# Patient Record
Sex: FEMALE | Race: WHITE | NOT HISPANIC OR LATINO | URBAN - METROPOLITAN AREA
[De-identification: names, ages, dates, MRNs, and addresses within clinical notes are randomized per-mention and may not be internally consistent; named-entity substitution may affect disease eponyms.]

---

## 2023-04-11 PROBLEM — T78.1XXA ALLERGIC REACTION TO FOOD: Status: ACTIVE | Noted: 2023-04-11

## 2023-04-11 PROBLEM — N95.1 MENOPAUSAL SYMPTOMS: Status: ACTIVE | Noted: 2023-04-11

## 2023-04-11 PROBLEM — Z88.9 DRUG ALLERGY: Status: ACTIVE | Noted: 2023-04-11

## 2023-04-11 PROBLEM — L73.9 FOLLICULITIS: Status: ACTIVE | Noted: 2023-04-11

## 2023-04-11 PROBLEM — E66.812 CLASS 2 OBESITY WITH BODY MASS INDEX (BMI) OF 39.0 TO 39.9 IN ADULT: Status: ACTIVE | Noted: 2023-04-11

## 2023-04-11 PROBLEM — K90.0 CELIAC DISEASE (HHS-HCC): Status: ACTIVE | Noted: 2023-04-11

## 2023-04-11 PROBLEM — Z91.013 SHELLFISH ALLERGY: Status: ACTIVE | Noted: 2023-04-11

## 2023-04-11 PROBLEM — E78.5 HYPERLIPIDEMIA: Status: ACTIVE | Noted: 2023-04-11

## 2023-04-11 PROBLEM — K29.70 GASTRITIS: Status: ACTIVE | Noted: 2023-04-11

## 2023-04-11 PROBLEM — J02.9 PHARYNGITIS: Status: ACTIVE | Noted: 2023-04-11

## 2023-04-11 PROBLEM — R35.0 INCREASED URINARY FREQUENCY: Status: ACTIVE | Noted: 2023-04-11

## 2023-04-11 PROBLEM — M25.571 RIGHT ANKLE PAIN: Status: ACTIVE | Noted: 2023-04-11

## 2023-04-11 PROBLEM — L30.9 DERMATITIS: Status: ACTIVE | Noted: 2023-04-11

## 2023-04-11 PROBLEM — M19.90 ARTHRITIS: Status: ACTIVE | Noted: 2023-04-11

## 2023-04-11 PROBLEM — I10 HTN (HYPERTENSION): Status: ACTIVE | Noted: 2023-04-11

## 2023-04-11 PROBLEM — E66.9 CLASS 2 OBESITY WITH BODY MASS INDEX (BMI) OF 39.0 TO 39.9 IN ADULT: Status: ACTIVE | Noted: 2023-04-11

## 2023-04-11 PROBLEM — M54.9 BACK PAIN: Status: ACTIVE | Noted: 2023-04-11

## 2023-04-11 RX ORDER — LOSARTAN POTASSIUM 100 MG/1
1 TABLET ORAL DAILY
COMMUNITY
Start: 2018-05-18 | End: 2024-01-26 | Stop reason: SDUPTHER

## 2023-04-12 ENCOUNTER — OFFICE VISIT (OUTPATIENT)
Dept: PRIMARY CARE | Facility: CLINIC | Age: 58
End: 2023-04-12
Payer: COMMERCIAL

## 2023-04-12 VITALS
WEIGHT: 242 LBS | BODY MASS INDEX: 38.89 KG/M2 | SYSTOLIC BLOOD PRESSURE: 138 MMHG | HEIGHT: 66 IN | HEART RATE: 80 BPM | DIASTOLIC BLOOD PRESSURE: 84 MMHG

## 2023-04-12 DIAGNOSIS — L72.3 INFLAMED SEBACEOUS CYST: Primary | ICD-10-CM

## 2023-04-12 PROCEDURE — 3075F SYST BP GE 130 - 139MM HG: CPT | Performed by: NURSE PRACTITIONER

## 2023-04-12 PROCEDURE — 99213 OFFICE O/P EST LOW 20 MIN: CPT | Performed by: NURSE PRACTITIONER

## 2023-04-12 PROCEDURE — 3079F DIAST BP 80-89 MM HG: CPT | Performed by: NURSE PRACTITIONER

## 2023-04-12 PROCEDURE — 1036F TOBACCO NON-USER: CPT | Performed by: NURSE PRACTITIONER

## 2023-04-12 ASSESSMENT — ENCOUNTER SYMPTOMS
SORE THROAT: 0
HEADACHES: 0
NAUSEA: 0
PSYCHIATRIC NEGATIVE: 1
CONSTIPATION: 0
VOMITING: 0
DIARRHEA: 0
ABDOMINAL PAIN: 0
FEVER: 0
FATIGUE: 0
MUSCULOSKELETAL NEGATIVE: 1
DIZZINESS: 0
PALPITATIONS: 0
CHILLS: 0
COUGH: 0
NUMBNESS: 0
WEAKNESS: 0
SHORTNESS OF BREATH: 0

## 2023-04-12 ASSESSMENT — PATIENT HEALTH QUESTIONNAIRE - PHQ9
1. LITTLE INTEREST OR PLEASURE IN DOING THINGS: NOT AT ALL
SUM OF ALL RESPONSES TO PHQ9 QUESTIONS 1 AND 2: 0
2. FEELING DOWN, DEPRESSED OR HOPELESS: NOT AT ALL

## 2023-04-12 NOTE — PROGRESS NOTES
"Subjective   Patient ID: Eliz Trevino is a 58 y.o. female who presents for lump on her neck.    HPI  Patient is has a small lump on the front of her neck over her trachea.  Started out small but over the last couple weeks has gotten larger and tender.  She also notices a another small 1 starting to the side of the original lump.  Denies chest pain, SOB, palpitations, dizziness,  or GI issues.      Review of Systems   Constitutional:  Negative for chills, fatigue and fever.   HENT:  Negative for congestion, ear pain and sore throat.    Eyes:  Negative for visual disturbance.   Respiratory:  Negative for cough and shortness of breath.    Cardiovascular:  Negative for chest pain, palpitations and leg swelling.   Gastrointestinal:  Negative for abdominal pain, constipation, diarrhea, nausea and vomiting.   Genitourinary: Negative.    Musculoskeletal: Negative.    Skin:  Negative for rash.   Neurological:  Negative for dizziness, weakness, numbness and headaches.   Psychiatric/Behavioral: Negative.         Objective   /84   Pulse 80   Ht 1.676 m (5' 6\")   Wt 110 kg (242 lb)   BMI 39.06 kg/m²     Physical Exam  Constitutional:       General: She is not in acute distress.     Appearance: Normal appearance.   HENT:      Head: Normocephalic and atraumatic.      Right Ear: Tympanic membrane, ear canal and external ear normal.      Left Ear: Tympanic membrane, ear canal and external ear normal.      Nose: Nose normal.      Mouth/Throat:      Mouth: Mucous membranes are moist.      Pharynx: Oropharynx is clear.   Eyes:      Extraocular Movements: Extraocular movements intact.      Conjunctiva/sclera: Conjunctivae normal.      Pupils: Pupils are equal, round, and reactive to light.   Cardiovascular:      Rate and Rhythm: Normal rate and regular rhythm.      Pulses: Normal pulses.      Heart sounds: Normal heart sounds. No murmur heard.  Pulmonary:      Effort: Pulmonary effort is normal.      Breath sounds: " Normal breath sounds. No wheezing, rhonchi or rales.   Abdominal:      General: Bowel sounds are normal.      Palpations: Abdomen is soft.      Tenderness: There is no abdominal tenderness.   Musculoskeletal:         General: Normal range of motion.      Cervical back: Normal range of motion and neck supple.   Lymphadenopathy:      Comments: No lymphadenopathy noted   Skin:     General: Skin is warm and dry.      Findings: No rash.   Neurological:      General: No focal deficit present.      Mental Status: She is alert and oriented to person, place, and time.      Cranial Nerves: No cranial nerve deficit.      Coordination: Coordination normal.      Gait: Gait normal.   Psychiatric:         Mood and Affect: Mood normal.         Behavior: Behavior normal.         Assessment/Plan

## 2024-01-26 DIAGNOSIS — I10 HYPERTENSION, UNSPECIFIED TYPE: Primary | ICD-10-CM

## 2024-01-26 NOTE — TELEPHONE ENCOUNTER
Requested Prescriptions     Pending Prescriptions Disp Refills    losartan (Cozaar) 100 mg tablet 30 tablet 0     Sig: Take 1 tablet (100 mg) by mouth once daily. Must be seen for further refills

## 2024-01-29 RX ORDER — LOSARTAN POTASSIUM 100 MG/1
100 TABLET ORAL DAILY
Qty: 30 TABLET | Refills: 0 | Status: SHIPPED | OUTPATIENT
Start: 2024-01-29 | End: 2024-02-21 | Stop reason: SDUPTHER

## 2024-02-21 ENCOUNTER — OFFICE VISIT (OUTPATIENT)
Dept: PRIMARY CARE | Facility: CLINIC | Age: 59
End: 2024-02-21
Payer: COMMERCIAL

## 2024-02-21 VITALS
BODY MASS INDEX: 40.18 KG/M2 | HEART RATE: 85 BPM | SYSTOLIC BLOOD PRESSURE: 124 MMHG | OXYGEN SATURATION: 97 % | DIASTOLIC BLOOD PRESSURE: 79 MMHG | WEIGHT: 250 LBS | HEIGHT: 66 IN

## 2024-02-21 DIAGNOSIS — E55.9 VITAMIN D DEFICIENCY: ICD-10-CM

## 2024-02-21 DIAGNOSIS — Z12.31 ENCOUNTER FOR SCREENING MAMMOGRAM FOR MALIGNANT NEOPLASM OF BREAST: ICD-10-CM

## 2024-02-21 DIAGNOSIS — Z12.11 ENCOUNTER FOR SCREENING FOR MALIGNANT NEOPLASM OF COLON: ICD-10-CM

## 2024-02-21 DIAGNOSIS — I10 HYPERTENSION, UNSPECIFIED TYPE: Primary | ICD-10-CM

## 2024-02-21 DIAGNOSIS — E78.5 HYPERLIPIDEMIA, UNSPECIFIED HYPERLIPIDEMIA TYPE: ICD-10-CM

## 2024-02-21 PROCEDURE — 99214 OFFICE O/P EST MOD 30 MIN: CPT | Performed by: NURSE PRACTITIONER

## 2024-02-21 PROCEDURE — 3074F SYST BP LT 130 MM HG: CPT | Performed by: NURSE PRACTITIONER

## 2024-02-21 PROCEDURE — 1036F TOBACCO NON-USER: CPT | Performed by: NURSE PRACTITIONER

## 2024-02-21 PROCEDURE — 3078F DIAST BP <80 MM HG: CPT | Performed by: NURSE PRACTITIONER

## 2024-02-21 RX ORDER — LOSARTAN POTASSIUM 100 MG/1
100 TABLET ORAL DAILY
Qty: 90 TABLET | Refills: 3 | Status: SHIPPED | OUTPATIENT
Start: 2024-02-21

## 2024-02-21 ASSESSMENT — ENCOUNTER SYMPTOMS
ABDOMINAL PAIN: 0
MUSCULOSKELETAL NEGATIVE: 1
CHILLS: 0
NAUSEA: 0
WEAKNESS: 0
FEVER: 0
NUMBNESS: 0
PSYCHIATRIC NEGATIVE: 1
VOMITING: 0
COUGH: 0
DIZZINESS: 0
CONSTIPATION: 0
DIARRHEA: 0
HEADACHES: 0
PALPITATIONS: 0
SORE THROAT: 0
SHORTNESS OF BREATH: 0
FATIGUE: 0

## 2024-02-21 NOTE — PROGRESS NOTES
"Subjective   Patient ID: Eliz Trevino is a 58 y.o. female who presents for Med Refill.    Med Refill  Pertinent negatives include no abdominal pain, chest pain, chills, congestion, coughing, fatigue, fever, headaches, nausea, numbness, rash, sore throat, vomiting or weakness.      HPI:  Overall doing well.  Denies chest pain, SOB, palpitations, dizziness,  or GI issues.  Taking medications as prescribed   BP well controlled.      Review of Systems   Constitutional:  Negative for chills, fatigue and fever.   HENT:  Negative for congestion, ear pain and sore throat.    Eyes:  Negative for visual disturbance.   Respiratory:  Negative for cough and shortness of breath.    Cardiovascular:  Negative for chest pain, palpitations and leg swelling.   Gastrointestinal:  Negative for abdominal pain, constipation, diarrhea, nausea and vomiting.   Genitourinary: Negative.    Musculoskeletal: Negative.    Skin:  Negative for rash.   Neurological:  Negative for dizziness, weakness, numbness and headaches.   Psychiatric/Behavioral: Negative.         Objective   /79   Pulse 85   Ht 1.676 m (5' 6\")   Wt 113 kg (250 lb)   SpO2 97%   BMI 40.35 kg/m²     Physical Exam  Constitutional:       General: She is not in acute distress.     Appearance: Normal appearance.   HENT:      Head: Normocephalic and atraumatic.      Right Ear: Tympanic membrane, ear canal and external ear normal.      Left Ear: Tympanic membrane, ear canal and external ear normal.      Nose: Nose normal.      Mouth/Throat:      Mouth: Mucous membranes are moist.      Pharynx: Oropharynx is clear.   Eyes:      Extraocular Movements: Extraocular movements intact.      Conjunctiva/sclera: Conjunctivae normal.      Pupils: Pupils are equal, round, and reactive to light.   Cardiovascular:      Rate and Rhythm: Normal rate and regular rhythm.      Pulses: Normal pulses.      Heart sounds: Normal heart sounds. No murmur heard.  Pulmonary:      Effort: " Pulmonary effort is normal.      Breath sounds: Normal breath sounds. No wheezing, rhonchi or rales.   Abdominal:      General: Bowel sounds are normal.      Palpations: Abdomen is soft.      Tenderness: There is no abdominal tenderness.   Musculoskeletal:         General: Normal range of motion.      Cervical back: Normal range of motion and neck supple.   Lymphadenopathy:      Comments: No lymphadenopathy noted   Skin:     General: Skin is warm and dry.      Findings: No rash.   Neurological:      General: No focal deficit present.      Mental Status: She is alert and oriented to person, place, and time.      Cranial Nerves: No cranial nerve deficit.      Coordination: Coordination normal.      Gait: Gait normal.   Psychiatric:         Mood and Affect: Mood normal.         Behavior: Behavior normal.         Assessment/Plan   Problem List Items Addressed This Visit             ICD-10-CM    HTN (hypertension) - Primary I10    Relevant Medications    losartan (Cozaar) 100 mg tablet    Other Relevant Orders    Comprehensive Metabolic Panel    TSH with reflex to Free T4 if abnormal    Lipid Panel    Hyperlipidemia E78.5    Relevant Orders    Comprehensive Metabolic Panel    TSH with reflex to Free T4 if abnormal    Lipid Panel    CBC and Auto Differential    Vitamin D deficiency E55.9    Relevant Orders    Vitamin D 25-Hydroxy,Total (for eval of Vitamin D levels)    Encounter for screening mammogram for malignant neoplasm of breast Z12.31    Relevant Orders    BI mammo bilateral screening tomosynthesis   Follow up in 1 year or sooner if needed

## 2024-03-13 ENCOUNTER — APPOINTMENT (OUTPATIENT)
Dept: OBSTETRICS AND GYNECOLOGY | Facility: CLINIC | Age: 59
End: 2024-03-13
Payer: COMMERCIAL

## 2024-07-17 ENCOUNTER — OFFICE VISIT (OUTPATIENT)
Dept: PRIMARY CARE | Facility: CLINIC | Age: 59
End: 2024-07-17
Payer: COMMERCIAL

## 2024-07-17 ENCOUNTER — HOSPITAL ENCOUNTER (OUTPATIENT)
Dept: RADIOLOGY | Facility: HOSPITAL | Age: 59
Discharge: HOME | End: 2024-07-17
Payer: COMMERCIAL

## 2024-07-17 VITALS
SYSTOLIC BLOOD PRESSURE: 134 MMHG | DIASTOLIC BLOOD PRESSURE: 91 MMHG | OXYGEN SATURATION: 99 % | HEART RATE: 82 BPM | WEIGHT: 263 LBS | BODY MASS INDEX: 42.45 KG/M2

## 2024-07-17 DIAGNOSIS — M79.89 LEFT LEG SWELLING: ICD-10-CM

## 2024-07-17 DIAGNOSIS — M79.605 LEFT LEG PAIN: ICD-10-CM

## 2024-07-17 DIAGNOSIS — Z12.31 VISIT FOR SCREENING MAMMOGRAM: ICD-10-CM

## 2024-07-17 DIAGNOSIS — M79.89 LEFT LEG SWELLING: Primary | ICD-10-CM

## 2024-07-17 DIAGNOSIS — S89.92XA INJURY OF LEFT LOWER EXTREMITY, INITIAL ENCOUNTER: ICD-10-CM

## 2024-07-17 DIAGNOSIS — Z12.11 ENCOUNTER FOR SCREENING FOR MALIGNANT NEOPLASM OF COLON: ICD-10-CM

## 2024-07-17 PROCEDURE — 99214 OFFICE O/P EST MOD 30 MIN: CPT | Performed by: INTERNAL MEDICINE

## 2024-07-17 PROCEDURE — 93971 EXTREMITY STUDY: CPT

## 2024-07-17 PROCEDURE — 3080F DIAST BP >= 90 MM HG: CPT | Performed by: INTERNAL MEDICINE

## 2024-07-17 PROCEDURE — 3075F SYST BP GE 130 - 139MM HG: CPT | Performed by: INTERNAL MEDICINE

## 2024-07-17 PROCEDURE — 73590 X-RAY EXAM OF LOWER LEG: CPT | Mod: LT

## 2024-07-17 NOTE — PATIENT INSTRUCTIONS
Left leg with increase pain and swelling weeks after injury  Get xray of tibia  Get ultrasound of the leg to check for blood clot    Mammogram due, call [phone] to schedule mammogram  Bone density at age 62  Colon screening, will do annual stool test, ordered    Get Tdap, if not tetanus in the last 10 years, check with [facility] records to see if you had one there and when    Bp is high, , check bp at home 2-3 times a week and send in readings.   If high at home, will add second medication

## 2024-07-17 NOTE — PROGRESS NOTES
Subjective   Patient ID: Eliz Trevino is a 59 y.o. female who presents for Leg Injury (Fell 3-4 weeks ago / swelling left leg).    HPI     Review of Systems    Objective   Wt 119 kg (263 lb)   BMI 42.45 kg/m²     Physical Exam    Assessment/Plan

## 2024-07-17 NOTE — PROGRESS NOTES
Subjective   Patient ID: Eliz Trevino is a 59 y.o. female who presents for Leg Injury (Fell 3-4 weeks ago / swelling left leg).    1 month ago, she tripped up the steps into the house and the metal part of the step.   Was left shin and not bad initially but now swelling and a bit more tender. Doesn't hurt to walk , hurts to brush up against it. No hx of blood clots.   Started to swell, weeks after the injury.     No cp or pressure  She was lightheaded when she woke up during the day, has been more dizzy than faint. Not when rolling over in bed. Was bad on Sunday, she drank more water, but notes intermittently, notes when she goes sitting to standing.     Bowels are regular  No urinary issues, slight leakage, urge and stress but minor.   Mood is good.   Sleeps okay.          Review of Systems    Objective   BP (!) 134/91   Pulse 82   Wt 119 kg (263 lb)   SpO2 99%   BMI 42.45 kg/m²     Physical Exam  Constitutional:       Appearance: Normal appearance.   Cardiovascular:      Rate and Rhythm: Normal rate and regular rhythm.      Heart sounds: No murmur heard.  Pulmonary:      Breath sounds: Normal breath sounds.   Musculoskeletal:      Comments: Left leg with slight swelling/fullness just inferior and lateral to the patella  No calf pain to pressure  1+ edema on the left  Negative Amilcar's    Neurological:      Mental Status: She is alert.         Assessment/Plan          Patient Instructions   Left leg with increase pain and swelling weeks after injury  Get xray of tibia  Get ultrasound of the leg to check for blood clot    Mammogram due, call [phone] to schedule mammogram  Bone density at age 62  Colon screening, will do annual stool test, ordered    Get Tdap, if not tetanus in the last 10 years, check with [facility] clinic records to see if you had one there and when    Bp is high, , check bp at home 2-3 times a week and send in readings.   If high at home, will add second medication

## 2024-07-18 ENCOUNTER — LAB (OUTPATIENT)
Dept: LAB | Facility: LAB | Age: 59
End: 2024-07-18
Payer: COMMERCIAL

## 2024-07-18 DIAGNOSIS — E55.9 VITAMIN D DEFICIENCY: ICD-10-CM

## 2024-07-18 DIAGNOSIS — E78.5 HYPERLIPIDEMIA, UNSPECIFIED HYPERLIPIDEMIA TYPE: ICD-10-CM

## 2024-07-18 DIAGNOSIS — I10 HYPERTENSION, UNSPECIFIED TYPE: ICD-10-CM

## 2024-07-18 LAB
25(OH)D3 SERPL-MCNC: 14 NG/ML (ref 30–100)
ALBUMIN SERPL BCP-MCNC: 4.3 G/DL (ref 3.4–5)
ALP SERPL-CCNC: 103 U/L (ref 33–110)
ALT SERPL W P-5'-P-CCNC: 22 U/L (ref 7–45)
ANION GAP SERPL CALC-SCNC: 14 MMOL/L (ref 10–20)
AST SERPL W P-5'-P-CCNC: 23 U/L (ref 9–39)
BASOPHILS # BLD AUTO: 0.05 X10*3/UL (ref 0–0.1)
BASOPHILS NFR BLD AUTO: 1 %
BILIRUB SERPL-MCNC: 0.5 MG/DL (ref 0–1.2)
BUN SERPL-MCNC: 17 MG/DL (ref 6–23)
CALCIUM SERPL-MCNC: 9.3 MG/DL (ref 8.6–10.6)
CHLORIDE SERPL-SCNC: 106 MMOL/L (ref 98–107)
CHOLEST SERPL-MCNC: 220 MG/DL (ref 0–199)
CHOLESTEROL/HDL RATIO: 3
CO2 SERPL-SCNC: 25 MMOL/L (ref 21–32)
CREAT SERPL-MCNC: 0.73 MG/DL (ref 0.5–1.05)
EGFRCR SERPLBLD CKD-EPI 2021: >90 ML/MIN/1.73M*2
EOSINOPHIL # BLD AUTO: 0.16 X10*3/UL (ref 0–0.7)
EOSINOPHIL NFR BLD AUTO: 3.1 %
ERYTHROCYTE [DISTWIDTH] IN BLOOD BY AUTOMATED COUNT: 13 % (ref 11.5–14.5)
GLUCOSE SERPL-MCNC: 94 MG/DL (ref 74–99)
HCT VFR BLD AUTO: 41.1 % (ref 36–46)
HDLC SERPL-MCNC: 73.1 MG/DL
HGB BLD-MCNC: 13 G/DL (ref 12–16)
IMM GRANULOCYTES # BLD AUTO: 0.02 X10*3/UL (ref 0–0.7)
IMM GRANULOCYTES NFR BLD AUTO: 0.4 % (ref 0–0.9)
LDLC SERPL CALC-MCNC: 133 MG/DL
LYMPHOCYTES # BLD AUTO: 2.11 X10*3/UL (ref 1.2–4.8)
LYMPHOCYTES NFR BLD AUTO: 40.4 %
MCH RBC QN AUTO: 29.4 PG (ref 26–34)
MCHC RBC AUTO-ENTMCNC: 31.6 G/DL (ref 32–36)
MCV RBC AUTO: 93 FL (ref 80–100)
MONOCYTES # BLD AUTO: 0.49 X10*3/UL (ref 0.1–1)
MONOCYTES NFR BLD AUTO: 9.4 %
NEUTROPHILS # BLD AUTO: 2.39 X10*3/UL (ref 1.2–7.7)
NEUTROPHILS NFR BLD AUTO: 45.7 %
NON HDL CHOLESTEROL: 147 MG/DL (ref 0–149)
NRBC BLD-RTO: 0 /100 WBCS (ref 0–0)
PLATELET # BLD AUTO: 180 X10*3/UL (ref 150–450)
POTASSIUM SERPL-SCNC: 4 MMOL/L (ref 3.5–5.3)
PROT SERPL-MCNC: 6.7 G/DL (ref 6.4–8.2)
RBC # BLD AUTO: 4.42 X10*6/UL (ref 4–5.2)
SODIUM SERPL-SCNC: 141 MMOL/L (ref 136–145)
TRIGL SERPL-MCNC: 70 MG/DL (ref 0–149)
TSH SERPL-ACNC: 2.91 MIU/L (ref 0.44–3.98)
VLDL: 14 MG/DL (ref 0–40)
WBC # BLD AUTO: 5.2 X10*3/UL (ref 4.4–11.3)

## 2024-07-18 PROCEDURE — 85025 COMPLETE CBC W/AUTO DIFF WBC: CPT

## 2024-07-18 PROCEDURE — 84443 ASSAY THYROID STIM HORMONE: CPT

## 2024-07-18 PROCEDURE — 82306 VITAMIN D 25 HYDROXY: CPT

## 2024-07-18 PROCEDURE — 36415 COLL VENOUS BLD VENIPUNCTURE: CPT

## 2024-07-18 PROCEDURE — 80061 LIPID PANEL: CPT

## 2024-07-18 PROCEDURE — 80053 COMPREHEN METABOLIC PANEL: CPT

## 2024-07-19 ENCOUNTER — APPOINTMENT (OUTPATIENT)
Dept: PRIMARY CARE | Facility: CLINIC | Age: 59
End: 2024-07-19
Payer: COMMERCIAL

## 2024-07-23 ENCOUNTER — HOSPITAL ENCOUNTER (OUTPATIENT)
Dept: RADIOLOGY | Facility: HOSPITAL | Age: 59
Discharge: HOME | End: 2024-07-23
Payer: COMMERCIAL

## 2024-07-23 VITALS — HEIGHT: 66 IN | BODY MASS INDEX: 42.27 KG/M2 | WEIGHT: 263 LBS

## 2024-07-23 DIAGNOSIS — Z12.31 VISIT FOR SCREENING MAMMOGRAM: ICD-10-CM

## 2024-07-23 PROCEDURE — 77063 BREAST TOMOSYNTHESIS BI: CPT | Performed by: RADIOLOGY

## 2024-07-23 PROCEDURE — 77067 SCR MAMMO BI INCL CAD: CPT | Performed by: RADIOLOGY

## 2024-07-23 PROCEDURE — 77067 SCR MAMMO BI INCL CAD: CPT

## 2024-09-11 NOTE — PROGRESS NOTES
ASSESSMENT/PLAN  Encounter for well woman exam with routine gynecological exam  Routine well woman visit.   Your exam was normal today.   A pap and HPV test was done. If you are connected with the  on line system, you will be notified about your pap results through the patient portal in 2-3 weeks.      Encounter for screening mammogram for breast cancer  A screening mammogram requisition has been placed for 7/2024.    Extremely dense tissue of both breasts on mammography  Family history of breast cancer  - Referral to High Risk Breast Cancer; Future    Vulvar pruritus.   Reviewed vulvar care handout and gave to pt.  Okay to use over the counter hydrocortisone cream on focal area of labia. If persists, RTO.          SUBJECTIVE    PAP 2015 ASCUS HPV NEG  MAMMO 7-23-24   DEXA NEVER   COLON NEVER     HPI    58 yo presents for routine well woman visit.   Last visit over 5 years ago.  Menopausal since age 52.   C/o itchiness of bilateral labia. Also has left labial lump. Was using pantiliners but stopped. Does not use wipes.  7/2024 mammogram extremely dense breasts on imaging. Reviewed findings and significance.  Pts family history significant for mom and maternal GM h/o breast CA. No genetic testing for pt and family.  We talked about options, pt accepts referral to high risk breast center. Also discussed fast breast MRI.     REVIEW OF SYSTEMS    Constitutional: no fever, no chills, no recent weight gain, no recent weight loss, no fatigue.   Eyes: no eye pain, no vision problems, + dryness of the eyes.   ENT: no hearing loss, no nosebleeds, no sinus congestion.   Cardiovascular: no chest pain, no palpitations.  Respiratory: no shortness of breath, no cough, no wheezing.   Gastrointestinal: no abdominal pain, no constipation, no nausea, no diarrhea, no vomiting.   Genitourinary: no pelvic pain, no dysuria, no urinary incontinence, no change in urinary frequency, no vaginal dryness, + vaginal itching,  no vaginal  "discharge, no unexplained vaginal bleeding, no lesion/sore.   Musculoskeletal: no back pain, + joint swelling and no leg edema.   Integumentary: no rashes, no skin lesions, no breast pain, no nipple discharge, no breast lump.   Neurological: no headache, no numbness, no dizziness.   Psychiatric: no sleep disturbances, no anxiety, no depression.   Endocrine: no hot flashes, no loss of hair, no hirsutism.   Hematologic/Lymphatic: no swollen glands, no tendency for easy bleeding, no tendency for easy bruising.      OBJECTIVE    /80   Ht 1.664 m (5' 5.5\")   Wt 116 kg (255 lb)   BMI 41.79 kg/m²       Physical Exam     Constitutional: Alert and in no acute distress. Well developed, well nourished   Head and Face: Head and face: normal   Eyes: Normal external exam - nonicteric sclera, extraocular movements intact (EOMI) and no ptosis.   Ears, Nose, Mouth, and Throat: External inspection of ears and nose: normal   Neck: no neck asymmetry. Supple and thyroid not enlarged and there were no palpable thyroid nodules   Cardiovascular: Heart rate and rhythm were normal, normal S1 and S2, no gallops, and no murmurs   Pulmonary: No respiratory distress and clear bilateral breath sounds   Chest: Breasts: normal appearance, no nipple discharge and no skin changes and palpation of breasts and axillae: no palpable mass and no axillary lymphadenopathy   Abdomen: soft nontender; no abdominal mass palpated, no organomegaly and no hernias   Genitourinary: external genitalia: Left upper labia is a 1 x .5cm scaly area, mild erythema, otherwise no other lesions, no erythema, inguinal lymphadenopathy, Bartholin's urethral and [employer]'s glands: normal, urethra: normal, bladder: normal on palpation and perianal area: normal   Vagina: normal.   Cervix: Normal appearing without lesions.  Uterus: Normal, mobile, nontender.  Right Adnexa/parametria: Normal.   Left Adnexa/parametria: Normal.   Skin: normal skin color and pigmentation, normal " skin turgor, and no rash.   Psychiatric: alert and oriented x 3., affect normal to patient baseline and mood: appropriate        Katia Lewis MD

## 2024-09-12 ENCOUNTER — APPOINTMENT (OUTPATIENT)
Dept: OBSTETRICS AND GYNECOLOGY | Facility: CLINIC | Age: 59
End: 2024-09-12
Payer: COMMERCIAL

## 2024-09-12 VITALS
BODY MASS INDEX: 40.98 KG/M2 | HEIGHT: 66 IN | WEIGHT: 255 LBS | SYSTOLIC BLOOD PRESSURE: 126 MMHG | DIASTOLIC BLOOD PRESSURE: 80 MMHG

## 2024-09-12 DIAGNOSIS — Z12.31 ENCOUNTER FOR SCREENING MAMMOGRAM FOR BREAST CANCER: ICD-10-CM

## 2024-09-12 DIAGNOSIS — R92.343 EXTREMELY DENSE TISSUE OF BOTH BREASTS ON MAMMOGRAPHY: ICD-10-CM

## 2024-09-12 DIAGNOSIS — Z80.3 FAMILY HISTORY OF BREAST CANCER: ICD-10-CM

## 2024-09-12 DIAGNOSIS — Z12.4 CERVICAL CANCER SCREENING: ICD-10-CM

## 2024-09-12 DIAGNOSIS — Z01.419 ENCOUNTER FOR WELL WOMAN EXAM WITH ROUTINE GYNECOLOGICAL EXAM: Primary | ICD-10-CM

## 2024-09-12 PROCEDURE — 99386 PREV VISIT NEW AGE 40-64: CPT | Performed by: OBSTETRICS & GYNECOLOGY

## 2024-09-12 PROCEDURE — 3074F SYST BP LT 130 MM HG: CPT | Performed by: OBSTETRICS & GYNECOLOGY

## 2024-09-12 PROCEDURE — 3079F DIAST BP 80-89 MM HG: CPT | Performed by: OBSTETRICS & GYNECOLOGY

## 2024-09-12 PROCEDURE — 88175 CYTOPATH C/V AUTO FLUID REDO: CPT

## 2024-09-12 PROCEDURE — 87624 HPV HI-RISK TYP POOLED RSLT: CPT

## 2024-09-12 PROCEDURE — 1036F TOBACCO NON-USER: CPT | Performed by: OBSTETRICS & GYNECOLOGY

## 2024-09-12 PROCEDURE — 3008F BODY MASS INDEX DOCD: CPT | Performed by: OBSTETRICS & GYNECOLOGY

## 2025-01-15 ENCOUNTER — APPOINTMENT (OUTPATIENT)
Dept: SURGICAL ONCOLOGY | Facility: CLINIC | Age: 60
End: 2025-01-15
Payer: COMMERCIAL

## 2025-01-15 ENCOUNTER — OFFICE VISIT (OUTPATIENT)
Dept: URGENT CARE | Age: 60
End: 2025-01-15
Payer: COMMERCIAL

## 2025-01-15 VITALS
TEMPERATURE: 97.8 F | OXYGEN SATURATION: 98 % | HEART RATE: 94 BPM | SYSTOLIC BLOOD PRESSURE: 137 MMHG | DIASTOLIC BLOOD PRESSURE: 84 MMHG

## 2025-01-15 DIAGNOSIS — Z01.30 BLOOD PRESSURE CHECK: Primary | ICD-10-CM

## 2025-01-15 PROCEDURE — 3075F SYST BP GE 130 - 139MM HG: CPT

## 2025-01-15 PROCEDURE — 3079F DIAST BP 80-89 MM HG: CPT

## 2025-01-15 PROCEDURE — 99203 OFFICE O/P NEW LOW 30 MIN: CPT

## 2025-01-15 NOTE — PATIENT INSTRUCTIONS
You were seen at Urgent Care today for blood pressure check.  Please treat as discussed. Monitor for red flags which we spoke about, If your symptoms change, worsen or become concerning in any way, please go to the emergency room immediately, otherwise you can followup with your PCP in 2-3 days as needed.  A referral has been placed on your behalf.

## 2025-01-15 NOTE — PROGRESS NOTES
Subjective   Patient ID: Eliz Trevino is a 59 y.o. female. They present today with a chief complaint of Blood Pressure Check (Patient believes her blood pressure is right. ).    History of Present Illness  Patient is a very pleasant 59-year-old female with history of hypertension who presents to urgent care today with concern for elevated blood pressure.  She states she is currently taking blood pressure medication but checked her pressure at home this morning and it was unusually elevated.  She is not currently have a PCP as her doctor retired and she is trying to find a new one.  She denies any headaches, dizziness, numbness, tingling, weakness, chest pain or shortness of breath.  No other complaints or concerns mention at this time.      History provided by:  Patient      Past Medical History  Allergies as of 01/15/2025 - Reviewed 01/15/2025   Allergen Reaction Noted    Penicillins Itching 04/11/2023    Shellfish containing products Itching 04/11/2023       (Not in a hospital admission)         Past Medical History:   Diagnosis Date    Body mass index (BMI)40.0-44.9, adult 09/10/2021    BMI 40.0-44.9, adult    Elevated blood-pressure reading, without diagnosis of hypertension 01/24/2018    Elevated blood-pressure reading without diagnosis of hypertension    Personal history of other diseases of the female genital tract     Vaginal delivery    Personal history of other diseases of the female genital tract 01/24/2018    History of endometriosis    Personal history of other diseases of the nervous system and sense organs 01/24/2018    History of carpal tunnel syndrome    Plantar fascial fibromatosis 01/24/2018    Plantar fasciitis       Past Surgical History:   Procedure Laterality Date    LAPAROSCOPY DIAGNOSTIC / BIOPSY / ASPIRATION / LYSIS  06/20/2014    Exploratory Laparoscopy        reports that she has never smoked. She has never used smokeless tobacco. She reports current alcohol use. She reports that she  does not use drugs.    Review of Systems  Review of Systems   All other systems reviewed and are negative.                                 Objective    Vitals:    01/15/25 0935   BP: 137/84   BP Location: Left arm   Patient Position: Sitting   BP Cuff Size: Large adult   Pulse: 94   Temp: 36.6 °C (97.8 °F)   TempSrc: Oral   SpO2: 98%     No LMP recorded. Patient is postmenopausal.    Physical Exam  Vitals and nursing note reviewed.   Constitutional:       General: She is not in acute distress.     Appearance: Normal appearance. She is not ill-appearing, toxic-appearing or diaphoretic.   HENT:      Head: Normocephalic and atraumatic.      Mouth/Throat:      Mouth: Mucous membranes are moist.   Eyes:      Extraocular Movements: Extraocular movements intact.      Conjunctiva/sclera: Conjunctivae normal.      Pupils: Pupils are equal, round, and reactive to light.   Cardiovascular:      Rate and Rhythm: Normal rate and regular rhythm.      Pulses: Normal pulses.      Heart sounds: Normal heart sounds.   Pulmonary:      Effort: Pulmonary effort is normal. No respiratory distress.      Breath sounds: Normal breath sounds. No stridor. No wheezing, rhonchi or rales.   Chest:      Chest wall: No tenderness.   Musculoskeletal:         General: Normal range of motion.      Cervical back: Normal range of motion and neck supple.   Skin:     General: Skin is warm and dry.      Capillary Refill: Capillary refill takes less than 2 seconds.   Neurological:      General: No focal deficit present.      Mental Status: She is alert and oriented to person, place, and time.   Psychiatric:         Mood and Affect: Mood normal.         Behavior: Behavior normal.         Procedures      Assessment/Plan   Allergies, medications, history, and pertinent labs/EKGs/Imaging reviewed by ZOË Armstrong.     Medical Decision Making    Patient is well appearing, afebrile, non toxic, not hypoxic, and appropriate for outpatient treatment and  "management at time of evaluation. Patient presents with concern for elevated blood pressure.     Differential includes but not limited to: Hypertension, anxiety, TIA, other    On exam, patient is alert and oriented.  She is no acute distress.  She is answering questions appropriately.  She is afebrile appears well-hydrated.  Blood pressure is 137/84.  Vitals are within normal limits.  She currently has no physical complaints but states every now and then she feels \"like my eyes are moving\" especially after looking at something for a long time.    While her blood pressure is slightly elevated, I do not feel it warrants emergency room evaluation at this time.  I recommended she recheck her pressure multiple times throughout the day after resting 10 to 15 minutes prior to check.  Also recommended she ensure cuff size is appropriate.  Additionally, recommended she visit her eye doctor for checkup.  A referral to PCP was placed on her behalf.  Red flags and ER precautions discussed.  She voices understanding and is agreeable to this plan.  She was discharged in stable condition.  All questions and concerns addressed.           Orders and Diagnoses  Diagnoses and all orders for this visit:  Blood pressure check  -     Referral to Primary Care; Future      Medical Admin Record      Follow Up Instructions  No follow-ups on file.    Patient disposition: Home    Electronically signed by ZOË Armstrong  10:05 AM  "

## 2025-03-11 ENCOUNTER — OFFICE VISIT (OUTPATIENT)
Dept: URGENT CARE | Age: 60
End: 2025-03-11
Payer: COMMERCIAL

## 2025-03-11 VITALS
TEMPERATURE: 97.9 F | HEART RATE: 80 BPM | DIASTOLIC BLOOD PRESSURE: 84 MMHG | RESPIRATION RATE: 16 BRPM | SYSTOLIC BLOOD PRESSURE: 140 MMHG | OXYGEN SATURATION: 99 %

## 2025-03-11 DIAGNOSIS — Z76.0 ENCOUNTER FOR MEDICATION REFILL: ICD-10-CM

## 2025-03-11 DIAGNOSIS — I10 ESSENTIAL HYPERTENSION, BENIGN: Primary | ICD-10-CM

## 2025-03-11 PROCEDURE — 3077F SYST BP >= 140 MM HG: CPT | Performed by: SPECIALIST

## 2025-03-11 PROCEDURE — 99213 OFFICE O/P EST LOW 20 MIN: CPT | Performed by: SPECIALIST

## 2025-03-11 PROCEDURE — 3079F DIAST BP 80-89 MM HG: CPT | Performed by: SPECIALIST

## 2025-03-11 RX ORDER — LOSARTAN POTASSIUM 100 MG/1
100 TABLET ORAL DAILY
Qty: 90 TABLET | Refills: 1 | Status: SHIPPED | OUTPATIENT
Start: 2025-03-11 | End: 2025-09-07

## 2025-03-11 ASSESSMENT — ENCOUNTER SYMPTOMS
SHORTNESS OF BREATH: 0
CONSTITUTIONAL NEGATIVE: 1
CHEST TIGHTNESS: 0
PALPITATIONS: 0

## 2025-03-11 NOTE — PROGRESS NOTES
Subjective   Patient ID: Eliz Trevino is a 60 y.o. female. They present today with a chief complaint of Med Refill (PCP retired .  Need Losartan Potassium 100 Mg).    History of Present Illness    Med Refill      Past Medical History  Allergies as of 03/11/2025 - Reviewed 01/15/2025   Allergen Reaction Noted    Penicillins Itching 04/11/2023    Shellfish containing products Itching 04/11/2023       (Not in a hospital admission)       Past Medical History:   Diagnosis Date    Body mass index (BMI)40.0-44.9, adult 09/10/2021    BMI 40.0-44.9, adult    Elevated blood-pressure reading, without diagnosis of hypertension 01/24/2018    Elevated blood-pressure reading without diagnosis of hypertension    Personal history of other diseases of the female genital tract     Vaginal delivery    Personal history of other diseases of the female genital tract 01/24/2018    History of endometriosis    Personal history of other diseases of the nervous system and sense organs 01/24/2018    History of carpal tunnel syndrome    Plantar fascial fibromatosis 01/24/2018    Plantar fasciitis       Past Surgical History:   Procedure Laterality Date    LAPAROSCOPY DIAGNOSTIC / BIOPSY / ASPIRATION / LYSIS  06/20/2014    Exploratory Laparoscopy        reports that she has never smoked. She has never used smokeless tobacco. She reports current alcohol use. She reports that she does not use drugs.    Review of Systems  Review of Systems   Constitutional: Negative.    Respiratory:  Negative for chest tightness and shortness of breath.    Cardiovascular:  Negative for chest pain, palpitations and leg swelling.                                  Objective    Vitals:    03/11/25 1824   BP: 140/84   Pulse: 80   Resp: 16   Temp: 36.6 °C (97.9 °F)   TempSrc: Oral   SpO2: 99%     No LMP recorded. Patient is postmenopausal.    Physical Exam  Constitutional:       Appearance: Normal appearance. She is obese.   Cardiovascular:      Rate and Rhythm:  Normal rate and regular rhythm.   Pulmonary:      Effort: Pulmonary effort is normal.      Breath sounds: Normal breath sounds.   Neurological:      General: No focal deficit present.      Mental Status: She is alert and oriented to person, place, and time.         Procedures    Point of Care Test & Imaging Results from this visit  No results found for this visit on 03/11/25.   No results found.    Diagnostic study results (if any) were reviewed by Laan Tom MD.    Assessment/Plan   Allergies, medications, history, and pertinent labs/EKGs/Imaging reviewed by Lana Tom MD.     Medical Decision Making  HTN, needs refill on her meds    Orders and Diagnoses  There are no diagnoses linked to this encounter.    Medical Admin Record      Patient disposition: Home    Electronically signed by Lana Tom MD  6:37 PM

## 2025-08-27 ENCOUNTER — OFFICE VISIT (OUTPATIENT)
Dept: SURGICAL ONCOLOGY | Facility: CLINIC | Age: 60
End: 2025-08-27
Payer: COMMERCIAL

## 2025-08-27 VITALS
TEMPERATURE: 98.4 F | WEIGHT: 265.4 LBS | SYSTOLIC BLOOD PRESSURE: 135 MMHG | HEART RATE: 94 BPM | BODY MASS INDEX: 43.49 KG/M2 | OXYGEN SATURATION: 95 % | DIASTOLIC BLOOD PRESSURE: 83 MMHG

## 2025-08-27 DIAGNOSIS — Z12.39 BREAST CANCER SCREENING, HIGH RISK PATIENT: ICD-10-CM

## 2025-08-27 DIAGNOSIS — Z80.3 FAMILY HISTORY OF BREAST CANCER: ICD-10-CM

## 2025-08-27 DIAGNOSIS — Z12.31 SCREENING MAMMOGRAM FOR BREAST CANCER: Primary | ICD-10-CM

## 2025-08-27 DIAGNOSIS — R92.343 EXTREMELY DENSE TISSUE OF BOTH BREASTS ON MAMMOGRAPHY: ICD-10-CM

## 2025-08-27 PROCEDURE — 99205 OFFICE O/P NEW HI 60 MIN: CPT

## 2025-08-27 PROCEDURE — 99215 OFFICE O/P EST HI 40 MIN: CPT

## 2025-08-27 PROCEDURE — 3079F DIAST BP 80-89 MM HG: CPT

## 2025-08-27 PROCEDURE — 3075F SYST BP GE 130 - 139MM HG: CPT

## 2025-08-27 PROCEDURE — 1036F TOBACCO NON-USER: CPT

## 2025-08-27 ASSESSMENT — PAIN SCALES - GENERAL: PAINLEVEL_OUTOF10: 0-NO PAIN
